# Patient Record
Sex: MALE | Race: BLACK OR AFRICAN AMERICAN | NOT HISPANIC OR LATINO
[De-identification: names, ages, dates, MRNs, and addresses within clinical notes are randomized per-mention and may not be internally consistent; named-entity substitution may affect disease eponyms.]

---

## 2020-02-21 PROBLEM — Z00.00 ENCOUNTER FOR PREVENTIVE HEALTH EXAMINATION: Status: ACTIVE | Noted: 2020-02-21

## 2020-02-26 ENCOUNTER — APPOINTMENT (OUTPATIENT)
Dept: UROLOGY | Facility: CLINIC | Age: 60
End: 2020-02-26
Payer: COMMERCIAL

## 2020-02-26 VITALS
SYSTOLIC BLOOD PRESSURE: 130 MMHG | TEMPERATURE: 97.9 F | WEIGHT: 202 LBS | HEIGHT: 68 IN | DIASTOLIC BLOOD PRESSURE: 87 MMHG | HEART RATE: 60 BPM | BODY MASS INDEX: 30.62 KG/M2

## 2020-02-26 DIAGNOSIS — Z86.19 PERSONAL HISTORY OF OTHER INFECTIOUS AND PARASITIC DISEASES: ICD-10-CM

## 2020-02-26 PROCEDURE — 51798 US URINE CAPACITY MEASURE: CPT

## 2020-02-26 PROCEDURE — 99204 OFFICE O/P NEW MOD 45 MIN: CPT | Mod: 25

## 2020-02-26 NOTE — END OF VISIT
[] : Resident [FreeTextEntry3] : 60yo male with BPH. He thinks he tried tamsulosin in the past which didn't help. Recommend trial of alfuzosin. Check PSA, UA, culture. F/u 2 months

## 2020-02-26 NOTE — HISTORY OF PRESENT ILLNESS
[Urinary Urgency] : urinary urgency [Urinary Frequency] : urinary frequency [Nocturia] : nocturia [Straining] : straining [Weak Stream] : weak stream [1] : 1 [Suprapubic] : suprapubic area

## 2020-02-26 NOTE — PHYSICAL EXAM
[Normal Appearance] : normal appearance [Well Groomed] : well groomed [Edema] : no peripheral edema [Exaggerated Use Of Accessory Muscles For Inspiration] : no accessory muscle use [Abdomen Soft] : soft [Abdomen Tenderness] : non-tender [Penis Abnormality] : normal uncircumcised penis [Testes Tenderness] : no tenderness of the testes [Prostate Size ___ (0-4)] : prostate size [unfilled] (scale: 0-4) [FreeTextEntry1] : Prostate smooth and enlarged without nodules [Normal Station and Gait] : the gait and station were normal for the patient's age [] : no rash [No Focal Deficits] : no focal deficits [Oriented To Time, Place, And Person] : oriented to person, place, and time [Affect] : the affect was normal [Mood] : the mood was normal [Not Anxious] : not anxious

## 2020-02-28 LAB
APPEARANCE: CLEAR
BACTERIA UR CULT: NORMAL
BACTERIA: NEGATIVE
BILIRUBIN URINE: NEGATIVE
BLOOD URINE: NEGATIVE
COLOR: YELLOW
GLUCOSE QUALITATIVE U: NEGATIVE
HYALINE CASTS: 0 /LPF
KETONES URINE: NEGATIVE
LEUKOCYTE ESTERASE URINE: NEGATIVE
MICROSCOPIC-UA: NORMAL
NITRITE URINE: NEGATIVE
PH URINE: 6.5
PROTEIN URINE: NORMAL
PSA FREE FLD-MCNC: 49 %
PSA FREE SERPL-MCNC: 1 NG/ML
PSA SERPL-MCNC: 2.05 NG/ML
RED BLOOD CELLS URINE: 3 /HPF
SPECIFIC GRAVITY URINE: 1.03
SQUAMOUS EPITHELIAL CELLS: 0 /HPF
UROBILINOGEN URINE: NORMAL
WHITE BLOOD CELLS URINE: 1 /HPF

## 2020-05-11 ENCOUNTER — APPOINTMENT (OUTPATIENT)
Dept: UROLOGY | Facility: CLINIC | Age: 60
End: 2020-05-11
Payer: COMMERCIAL

## 2020-05-11 VITALS
HEIGHT: 68 IN | BODY MASS INDEX: 30.31 KG/M2 | TEMPERATURE: 98.5 F | SYSTOLIC BLOOD PRESSURE: 130 MMHG | DIASTOLIC BLOOD PRESSURE: 86 MMHG | WEIGHT: 200 LBS | HEART RATE: 61 BPM

## 2020-05-11 PROCEDURE — 99213 OFFICE O/P EST LOW 20 MIN: CPT

## 2020-05-11 NOTE — ASSESSMENT
[FreeTextEntry1] : 60yo male with BPH symptoms and suprapubic pressure\par No evidence of urinary retention\par No improvement with alfuzusin\par PSA wnl but UA with microhematuria\par Will check renal bladder US\par F/u for cystoscopy

## 2020-05-11 NOTE — HISTORY OF PRESENT ILLNESS
[Urinary Urgency] : urinary urgency [FreeTextEntry1] : 59M PMH hepatitis C s/p treatment 2017 presents with constant suprapubic pain and LUTS. The patient is most bothered by his suprapubic pain, which is described as a constant pressure, nonradiating, that does not improve after urination. The sensation has been present for 3 years, and has not worsened. He also notes urinary frequency, nocturia 3x/night, and urge urinary incontinence. He denies dysuria or hematuria. His PCP has told him that he has an enlarged prostate, and believes that he may have tried an alpha blocker, though he is unsure. He denies any abdominal or  surgeries. He denies any FMH of kidney, bladder, or prostate cancer. He denies tobacco use. He denies taking any medications or drug allergies.\par \par 5/11/20 Here for f/u. No improvement in symptoms with alfuzuson. PSA = 2, UA shows 3 rbc/hpf.   [Urinary Frequency] : urinary frequency [Nocturia] : nocturia [Straining] : straining [2] : 2 [Suprapubic] : suprapubic area

## 2020-05-11 NOTE — PHYSICAL EXAM
[General Appearance - Well Developed] : well developed [General Appearance - Well Nourished] : well nourished [Well Groomed] : well groomed [Normal Appearance] : normal appearance [Abdomen Tenderness] : non-tender [General Appearance - In No Acute Distress] : no acute distress [Abdomen Soft] : soft [Costovertebral Angle Tenderness] : no ~M costovertebral angle tenderness [Oriented To Time, Place, And Person] : oriented to person, place, and time [Mood] : the mood was normal [Affect] : the affect was normal [Normal Station and Gait] : the gait and station were normal for the patient's age [Not Anxious] : not anxious [No Focal Deficits] : no focal deficits

## 2020-05-11 NOTE — REVIEW OF SYSTEMS
[Abdominal Pain] : abdominal pain [Constipation] : no constipation [see HPI] : see HPI [Diarrhea] : no diarrhea [Negative] : Constitutional

## 2020-05-12 LAB
APPEARANCE: CLEAR
BACTERIA: NEGATIVE
BILIRUBIN URINE: NEGATIVE
BLOOD URINE: NEGATIVE
COLOR: YELLOW
GLUCOSE QUALITATIVE U: NEGATIVE
HYALINE CASTS: 0 /LPF
KETONES URINE: NEGATIVE
LEUKOCYTE ESTERASE URINE: NEGATIVE
MICROSCOPIC-UA: NORMAL
NITRITE URINE: NEGATIVE
PH URINE: 6.5
PROTEIN URINE: ABNORMAL
RED BLOOD CELLS URINE: 3 /HPF
SPECIFIC GRAVITY URINE: 1.03
SQUAMOUS EPITHELIAL CELLS: 1 /HPF
UROBILINOGEN URINE: NORMAL
WHITE BLOOD CELLS URINE: 1 /HPF

## 2020-05-13 LAB — BACTERIA UR CULT: NORMAL

## 2020-05-18 ENCOUNTER — APPOINTMENT (OUTPATIENT)
Dept: UROLOGY | Facility: CLINIC | Age: 60
End: 2020-05-18
Payer: COMMERCIAL

## 2020-05-18 VITALS — TEMPERATURE: 98.2 F | DIASTOLIC BLOOD PRESSURE: 89 MMHG | HEART RATE: 65 BPM | SYSTOLIC BLOOD PRESSURE: 146 MMHG

## 2020-05-18 PROCEDURE — 52000 CYSTOURETHROSCOPY: CPT

## 2020-08-24 ENCOUNTER — APPOINTMENT (OUTPATIENT)
Dept: UROLOGY | Facility: CLINIC | Age: 60
End: 2020-08-24
Payer: COMMERCIAL

## 2020-08-24 VITALS
BODY MASS INDEX: 30.31 KG/M2 | WEIGHT: 200 LBS | TEMPERATURE: 97.1 F | HEIGHT: 68 IN | SYSTOLIC BLOOD PRESSURE: 124 MMHG | HEART RATE: 58 BPM | DIASTOLIC BLOOD PRESSURE: 79 MMHG

## 2020-08-24 PROCEDURE — 99213 OFFICE O/P EST LOW 20 MIN: CPT

## 2020-08-24 RX ORDER — FINASTERIDE 5 MG/1
5 TABLET, FILM COATED ORAL DAILY
Qty: 90 | Refills: 3 | Status: DISCONTINUED | COMMUNITY
Start: 2020-05-18 | End: 2020-08-24

## 2020-08-24 NOTE — ASSESSMENT
[FreeTextEntry1] : 59yo male with BPH symptoms and suprapubic pressure\par No evidence of urinary retention\par No improvement with alfuzusin\par PSA wnl but UA with microhematuria\par Cystoscopy revealed enlarged prostate but he never got US done\par US reordered\par Can stop finasteride\par Recheck UA\par F/u to review

## 2020-08-24 NOTE — HISTORY OF PRESENT ILLNESS
[FreeTextEntry1] : 59M PMH hepatitis C s/p treatment 2017 presents with constant suprapubic pain and LUTS. The patient is most bothered by his suprapubic pain, which is described as a constant pressure, nonradiating, that does not improve after urination. The sensation has been present for 3 years, and has not worsened. He also notes urinary frequency, nocturia 3x/night, and urge urinary incontinence. He denies dysuria or hematuria. His PCP has told him that he has an enlarged prostate, and believes that he may have tried an alpha blocker, though he is unsure. He denies any abdominal or  surgeries. He denies any FMH of kidney, bladder, or prostate cancer. He denies tobacco use. He denies taking any medications or drug allergies.\par \par 5/11/20 Here for f/u. No improvement in symptoms with alfuzuson. PSA = 2, UA shows 3 rbc/hpf.  \par \par 8/24/20 Here for f/u. Still with suprapubic pain which is his primary complaint. No improvement with adding finasteride for BPH.  [Urinary Frequency] : urinary frequency [Urinary Urgency] : urinary urgency [Nocturia] : nocturia [Straining] : straining [2] : 2 [Suprapubic] : suprapubic area

## 2020-08-24 NOTE — PHYSICAL EXAM
[General Appearance - Well Developed] : well developed [Well Groomed] : well groomed [Normal Appearance] : normal appearance [General Appearance - Well Nourished] : well nourished [Abdomen Soft] : soft [General Appearance - In No Acute Distress] : no acute distress [Costovertebral Angle Tenderness] : no ~M costovertebral angle tenderness [Abdomen Tenderness] : non-tender [Affect] : the affect was normal [Mood] : the mood was normal [Oriented To Time, Place, And Person] : oriented to person, place, and time [Normal Station and Gait] : the gait and station were normal for the patient's age [No Focal Deficits] : no focal deficits [Not Anxious] : not anxious

## 2020-08-25 LAB
APPEARANCE: CLEAR
BACTERIA: NEGATIVE
BILIRUBIN URINE: NEGATIVE
BLOOD URINE: NEGATIVE
COLOR: YELLOW
GLUCOSE QUALITATIVE U: NEGATIVE
HYALINE CASTS: 0 /LPF
KETONES URINE: NEGATIVE
LEUKOCYTE ESTERASE URINE: NEGATIVE
MICROSCOPIC-UA: NORMAL
NITRITE URINE: NEGATIVE
PH URINE: 6
PROTEIN URINE: NORMAL
RED BLOOD CELLS URINE: 3 /HPF
SPECIFIC GRAVITY URINE: 1.03
SQUAMOUS EPITHELIAL CELLS: 0 /HPF
UROBILINOGEN URINE: NORMAL
WHITE BLOOD CELLS URINE: 0 /HPF

## 2020-08-27 LAB — BACTERIA UR CULT: NORMAL

## 2020-09-16 ENCOUNTER — APPOINTMENT (OUTPATIENT)
Dept: UROLOGY | Facility: CLINIC | Age: 60
End: 2020-09-16
Payer: COMMERCIAL

## 2020-09-16 VITALS
OXYGEN SATURATION: 98 % | HEART RATE: 62 BPM | TEMPERATURE: 97.2 F | DIASTOLIC BLOOD PRESSURE: 82 MMHG | SYSTOLIC BLOOD PRESSURE: 124 MMHG

## 2020-09-16 PROCEDURE — 99213 OFFICE O/P EST LOW 20 MIN: CPT

## 2020-09-16 NOTE — PHYSICAL EXAM
[General Appearance - Well Developed] : well developed [General Appearance - Well Nourished] : well nourished [Normal Appearance] : normal appearance [Well Groomed] : well groomed [General Appearance - In No Acute Distress] : no acute distress [Abdomen Tenderness] : non-tender [Abdomen Soft] : soft [Oriented To Time, Place, And Person] : oriented to person, place, and time [Costovertebral Angle Tenderness] : no ~M costovertebral angle tenderness [Affect] : the affect was normal [Mood] : the mood was normal [Not Anxious] : not anxious [Normal Station and Gait] : the gait and station were normal for the patient's age [No Focal Deficits] : no focal deficits

## 2020-09-16 NOTE — REVIEW OF SYSTEMS
[Abdominal Pain] : abdominal pain [Diarrhea] : no diarrhea [Constipation] : no constipation [see HPI] : see HPI [Negative] : Constitutional

## 2020-09-16 NOTE — ASSESSMENT
[FreeTextEntry1] : 59yo male with BPH symptoms and suprapubic pressure\par No evidence of urinary retention\par No improvement with alfuzusin\par PSA wnl but UA with microhematuria\par Cystoscopy revealed enlarged prostate but he never got US done\par US normal\par Counseled on avoidance of bladder irritants. \par F/u 3 months

## 2020-09-16 NOTE — HISTORY OF PRESENT ILLNESS
[FreeTextEntry1] : 59M PMH hepatitis C s/p treatment 2017 presents with constant suprapubic pain and LUTS. The patient is most bothered by his suprapubic pain, which is described as a constant pressure, nonradiating, that does not improve after urination. The sensation has been present for 3 years, and has not worsened. He also notes urinary frequency, nocturia 3x/night, and urge urinary incontinence. He denies dysuria or hematuria. His PCP has told him that he has an enlarged prostate, and believes that he may have tried an alpha blocker, though he is unsure. He denies any abdominal or  surgeries. He denies any FMH of kidney, bladder, or prostate cancer. He denies tobacco use. He denies taking any medications or drug allergies.\par \par 5/11/20 Here for f/u. No improvement in symptoms with alfuzuson. PSA = 2, UA shows 3 rbc/hpf.  \par \par 8/24/20 Here for f/u. Still with suprapubic pain which is his primary complaint. No improvement with adding finasteride for BPH. \par \par 9/16/20 Here for f/u. Still with suprapubic pain.  [Urinary Urgency] : urinary urgency [Urinary Frequency] : urinary frequency [Nocturia] : nocturia [Straining] : straining [2] : 2 [Suprapubic] : suprapubic area

## 2021-02-08 ENCOUNTER — APPOINTMENT (OUTPATIENT)
Dept: UROLOGY | Facility: CLINIC | Age: 61
End: 2021-02-08

## 2022-04-27 ENCOUNTER — NON-APPOINTMENT (OUTPATIENT)
Age: 62
End: 2022-04-27

## 2022-05-02 ENCOUNTER — APPOINTMENT (OUTPATIENT)
Dept: UROLOGY | Facility: CLINIC | Age: 62
End: 2022-05-02
Payer: COMMERCIAL

## 2022-05-02 VITALS
HEIGHT: 68 IN | HEART RATE: 57 BPM | WEIGHT: 200 LBS | DIASTOLIC BLOOD PRESSURE: 80 MMHG | TEMPERATURE: 97.7 F | SYSTOLIC BLOOD PRESSURE: 122 MMHG | BODY MASS INDEX: 30.31 KG/M2

## 2022-05-02 PROCEDURE — 99214 OFFICE O/P EST MOD 30 MIN: CPT | Mod: 25

## 2022-05-02 PROCEDURE — 51798 US URINE CAPACITY MEASURE: CPT

## 2022-05-02 RX ORDER — ALFUZOSIN HYDROCHLORIDE 10 MG/1
10 TABLET, EXTENDED RELEASE ORAL
Qty: 30 | Refills: 5 | Status: DISCONTINUED | COMMUNITY
Start: 2020-02-26 | End: 2022-05-02

## 2022-05-03 NOTE — PHYSICAL EXAM
[General Appearance - Well Developed] : well developed [General Appearance - Well Nourished] : well nourished [Normal Appearance] : normal appearance [Well Groomed] : well groomed [General Appearance - In No Acute Distress] : no acute distress [Abdomen Soft] : soft [Abdomen Tenderness] : non-tender [Costovertebral Angle Tenderness] : no ~M costovertebral angle tenderness [Urethral Meatus] : meatus normal [Urinary Bladder Findings] : the bladder was normal on palpation [Scrotum] : the scrotum was normal [Testes Mass (___cm)] : there were no testicular masses [Prostate Tenderness] : the prostate was not tender [No Prostate Nodules] : no prostate nodules [] : no rash [Oriented To Time, Place, And Person] : oriented to person, place, and time [Affect] : the affect was normal [Mood] : the mood was normal [Not Anxious] : not anxious [Normal Station and Gait] : the gait and station were normal for the patient's age [No Focal Deficits] : no focal deficits [Abdomen Mass (___ Cm)] : no abdominal mass palpated [Abdomen Hernia] : no hernia was discovered

## 2022-05-03 NOTE — END OF VISIT
[FreeTextEntry3] : I, Dr. Powell, personally performed the evaluation and management (E/M) services for this established patient who presents today with (a) new problem(s)/exacerbation of (an) existing condition(s).  That E/M includes conducting the examination, assessing all new/exacerbated conditions, and establishing a new plan of care.  Today, our ACP, Ewelina Fine, was here to observe the evaluation and management services for this new problem/exacerbated condition to be followed going forward.\par

## 2022-05-03 NOTE — REVIEW OF SYSTEMS
[Abdominal Pain] : abdominal pain [see HPI] : see HPI [Negative] : Heme/Lymph [Constipation] : no constipation [Diarrhea] : no diarrhea

## 2022-05-03 NOTE — HISTORY OF PRESENT ILLNESS
[Urinary Urgency] : urinary urgency [Urinary Frequency] : urinary frequency [Nocturia] : nocturia [Straining] : straining [2] : 2 [Suprapubic] : suprapubic area [FreeTextEntry1] : 59M PMH hepatitis C s/p treatment 2017 presents with constant suprapubic pain and LUTS. The patient is most bothered by his suprapubic pain, which is described as a constant pressure, nonradiating, that does not improve after urination. The sensation has been present for 3 years, and has not worsened. He also notes urinary frequency, nocturia 3x/night, and urge urinary incontinence. He denies dysuria or hematuria. His PCP has told him that he has an enlarged prostate, and believes that he may have tried an alpha blocker, though he is unsure. He denies any abdominal or  surgeries. He denies any FMH of kidney, bladder, or prostate cancer. He denies tobacco use. He denies taking any medications or drug allergies.\par \par 5/11/20 Here for f/u. No improvement in symptoms with alfuzuson. PSA = 2, UA shows 3 rbc/hpf.  \par \par 8/24/20 Here for f/u. Still with suprapubic pain which is his primary complaint. No improvement with adding finasteride for BPH. \par \par 9/16/20 Here for f/u. Still with suprapubic pain. \par \par 5/2/22 Here for follow up. Still with suprapubic pain which is primary complaint. Stopped taking Alfusozin about 1 year ago due to ineffectiveness. Regarding urinary symptoms, he is most bothered by weak stream, straining, PVD, sensation of incomplete emptying. Wears pads x1.

## 2022-05-03 NOTE — ASSESSMENT
[FreeTextEntry1] : 60yo male with BPH symptoms and suprapubic pressure for the past 3 years \par No evidence of urinary retention, PVR 2 ml today\par \par Unclear etiology of suprapubic pain but this has been a chronic complaint. \par Prior history of microhematuria\par Check CT urogram \par \par BPH/LUTS\par No improvement with alfuzosin\par Trial silodosin. Med goals, side effects reviewed with patient. \par Ua/Ucx pending\par \par PSA today \par

## 2022-05-19 ENCOUNTER — APPOINTMENT (OUTPATIENT)
Dept: CT IMAGING | Facility: HOSPITAL | Age: 62
End: 2022-05-19

## 2022-05-19 ENCOUNTER — OUTPATIENT (OUTPATIENT)
Dept: OUTPATIENT SERVICES | Facility: HOSPITAL | Age: 62
LOS: 1 days | End: 2022-05-19
Payer: COMMERCIAL

## 2022-05-19 LAB — POCT ISTAT CREATININE: 1 MG/DL — SIGNIFICANT CHANGE UP (ref 0.5–1.3)

## 2022-05-19 PROCEDURE — 82565 ASSAY OF CREATININE: CPT

## 2022-05-19 PROCEDURE — 74178 CT ABD&PLV WO CNTR FLWD CNTR: CPT

## 2022-05-19 PROCEDURE — 74178 CT ABD&PLV WO CNTR FLWD CNTR: CPT | Mod: 26

## 2022-05-24 ENCOUNTER — NON-APPOINTMENT (OUTPATIENT)
Age: 62
End: 2022-05-24

## 2022-11-16 ENCOUNTER — APPOINTMENT (OUTPATIENT)
Dept: UROLOGY | Facility: CLINIC | Age: 62
End: 2022-11-16

## 2022-11-16 VITALS
DIASTOLIC BLOOD PRESSURE: 85 MMHG | WEIGHT: 200 LBS | HEART RATE: 64 BPM | SYSTOLIC BLOOD PRESSURE: 143 MMHG | TEMPERATURE: 97.3 F | BODY MASS INDEX: 30.31 KG/M2 | HEIGHT: 68 IN

## 2022-11-16 DIAGNOSIS — N40.1 BENIGN PROSTATIC HYPERPLASIA WITH LOWER URINARY TRACT SYMPMS: ICD-10-CM

## 2022-11-16 DIAGNOSIS — R31.29 OTHER MICROSCOPIC HEMATURIA: ICD-10-CM

## 2022-11-16 DIAGNOSIS — N13.8 BENIGN PROSTATIC HYPERPLASIA WITH LOWER URINARY TRACT SYMPMS: ICD-10-CM

## 2022-11-16 DIAGNOSIS — R10.2 PELVIC AND PERINEAL PAIN: ICD-10-CM

## 2022-11-16 PROCEDURE — 99214 OFFICE O/P EST MOD 30 MIN: CPT

## 2022-11-16 RX ORDER — SILODOSIN 8 MG/1
8 CAPSULE ORAL DAILY
Qty: 90 | Refills: 3 | Status: DISCONTINUED | COMMUNITY
Start: 2022-05-02 | End: 2022-11-16

## 2022-11-16 NOTE — PHYSICAL EXAM
[General Appearance - Well Developed] : well developed [General Appearance - Well Nourished] : well nourished [Normal Appearance] : normal appearance [Well Groomed] : well groomed [General Appearance - In No Acute Distress] : no acute distress [Abdomen Soft] : soft [Abdomen Tenderness] : non-tender [Abdomen Mass (___ Cm)] : no abdominal mass palpated [Abdomen Hernia] : no hernia was discovered [Costovertebral Angle Tenderness] : no ~M costovertebral angle tenderness [Urethral Meatus] : meatus normal [Urinary Bladder Findings] : the bladder was normal on palpation [Scrotum] : the scrotum was normal [Testes Mass (___cm)] : there were no testicular masses

## 2022-11-16 NOTE — ASSESSMENT
[FreeTextEntry1] : 61yo male with BPH symptoms and suprapubic pressure for the >3 years \par No evidence of urinary retention\par \par Unclear etiology of suprapubic pain but this has been a chronic complaint, he thinks could be secondary or related to LUTS \par Prior history of microhematuria\par CT urogram negative\par \par BPH/LUTS\par No improvement with alfuzosin or silodisin \par Discussed BPH outlet procedures\par Prostate size 60-80gm \par Discussed laser PVP and handout reviewed\par \par He will consider PVP and call us back if he wants to proceed\par \par Check PSA, UA, culture today \par

## 2022-11-16 NOTE — HISTORY OF PRESENT ILLNESS
[Urinary Urgency] : urinary urgency [Urinary Frequency] : urinary frequency [Nocturia] : nocturia [Straining] : straining [2] : 2 [Suprapubic] : suprapubic area [FreeTextEntry1] : 59M PMH hepatitis C s/p treatment 2017 presents with constant suprapubic pain and LUTS. The patient is most bothered by his suprapubic pain, which is described as a constant pressure, nonradiating, that does not improve after urination. The sensation has been present for 3 years, and has not worsened. He also notes urinary frequency, nocturia 3x/night, and urge urinary incontinence. He denies dysuria or hematuria. His PCP has told him that he has an enlarged prostate, and believes that he may have tried an alpha blocker, though he is unsure. He denies any abdominal or  surgeries. He denies any FMH of kidney, bladder, or prostate cancer. He denies tobacco use. He denies taking any medications or drug allergies.\par \par 5/11/20 Here for f/u. No improvement in symptoms with alfuzuson. PSA = 2, UA shows 3 rbc/hpf.  \par \par 8/24/20 Here for f/u. Still with suprapubic pain which is his primary complaint. No improvement with adding finasteride for BPH. \par \par 9/16/20 Here for f/u. Still with suprapubic pain. \par \par 5/2/22 Here for follow up. Still with suprapubic pain which is primary complaint. Stopped taking Alfusozin about 1 year ago due to ineffectiveness. Regarding urinary symptoms, he is most bothered by weak stream, straining, PVD, sensation of incomplete emptying. Wears pads x1. \par \par 11/16/22 No change in symptoms. Stopped Rapaflo because it didn't help. C/o nocturia, frequency, weak stream, dribbling and suprapubic discomfort.

## 2022-11-17 LAB
APPEARANCE: CLEAR
BACTERIA UR CULT: NORMAL
BACTERIA: NEGATIVE
BILIRUBIN URINE: NEGATIVE
BLOOD URINE: NEGATIVE
COLOR: YELLOW
GLUCOSE QUALITATIVE U: NEGATIVE
HYALINE CASTS: 0 /LPF
KETONES URINE: NEGATIVE
LEUKOCYTE ESTERASE URINE: NEGATIVE
MICROSCOPIC-UA: NORMAL
NITRITE URINE: NEGATIVE
PH URINE: 6.5
PROTEIN URINE: ABNORMAL
PSA SERPL-MCNC: 3.28 NG/ML
RED BLOOD CELLS URINE: 3 /HPF
SPECIFIC GRAVITY URINE: 1.03
SQUAMOUS EPITHELIAL CELLS: 0 /HPF
UROBILINOGEN URINE: NORMAL
WHITE BLOOD CELLS URINE: 1 /HPF